# Patient Record
Sex: FEMALE | Race: WHITE | NOT HISPANIC OR LATINO | Employment: FULL TIME | URBAN - METROPOLITAN AREA
[De-identification: names, ages, dates, MRNs, and addresses within clinical notes are randomized per-mention and may not be internally consistent; named-entity substitution may affect disease eponyms.]

---

## 2021-03-19 ENCOUNTER — AMBULATORY - HEALTHEAST (OUTPATIENT)
Dept: NURSING | Facility: CLINIC | Age: 51
End: 2021-03-19

## 2021-04-12 ENCOUNTER — AMBULATORY - HEALTHEAST (OUTPATIENT)
Dept: NURSING | Facility: CLINIC | Age: 51
End: 2021-04-12

## 2021-09-02 ENCOUNTER — APPOINTMENT (OUTPATIENT)
Dept: URGENT CARE | Facility: CLINIC | Age: 51
End: 2021-09-02

## 2021-10-17 ENCOUNTER — HEALTH MAINTENANCE LETTER (OUTPATIENT)
Age: 51
End: 2021-10-17

## 2022-10-03 ENCOUNTER — HEALTH MAINTENANCE LETTER (OUTPATIENT)
Age: 52
End: 2022-10-03

## 2023-02-11 ENCOUNTER — HEALTH MAINTENANCE LETTER (OUTPATIENT)
Age: 53
End: 2023-02-11

## 2023-09-07 ENCOUNTER — OFFICE VISIT (OUTPATIENT)
Dept: FAMILY MEDICINE | Facility: CLINIC | Age: 53
End: 2023-09-07
Payer: COMMERCIAL

## 2023-09-07 VITALS
RESPIRATION RATE: 16 BRPM | OXYGEN SATURATION: 99 % | TEMPERATURE: 100.6 F | HEART RATE: 91 BPM | WEIGHT: 146 LBS | SYSTOLIC BLOOD PRESSURE: 138 MMHG | DIASTOLIC BLOOD PRESSURE: 84 MMHG

## 2023-09-07 DIAGNOSIS — R50.9 FEVER, UNSPECIFIED FEVER CAUSE: ICD-10-CM

## 2023-09-07 DIAGNOSIS — R30.0 DYSURIA: Primary | ICD-10-CM

## 2023-09-07 DIAGNOSIS — R81 GLUCOSURIA: ICD-10-CM

## 2023-09-07 PROBLEM — H52.4 PRESBYOPIA: Status: ACTIVE | Noted: 2019-08-29

## 2023-09-07 PROBLEM — H52.03 HYPEROPIA, BILATERAL: Status: ACTIVE | Noted: 2019-08-29

## 2023-09-07 PROBLEM — S83.512A LEFT ACL TEAR: Status: ACTIVE | Noted: 2019-12-09

## 2023-09-07 PROBLEM — H40.053 BORDERLINE GLAUCOMA OF BOTH EYES WITH OCULAR HYPERTENSION: Status: ACTIVE | Noted: 2019-08-29

## 2023-09-07 PROBLEM — H52.223 REGULAR ASTIGMATISM, BILATERAL: Status: ACTIVE | Noted: 2019-08-29

## 2023-09-07 LAB
ALBUMIN UR-MCNC: NEGATIVE MG/DL
APPEARANCE UR: CLEAR
BACTERIA #/AREA URNS HPF: ABNORMAL /HPF
BASOPHILS # BLD AUTO: 0 10E3/UL (ref 0–0.2)
BASOPHILS NFR BLD AUTO: 0 %
BILIRUB UR QL STRIP: NEGATIVE
CLUE CELLS: ABNORMAL
COLOR UR AUTO: YELLOW
EOSINOPHIL # BLD AUTO: 0.1 10E3/UL (ref 0–0.7)
EOSINOPHIL NFR BLD AUTO: 1 %
ERYTHROCYTE [DISTWIDTH] IN BLOOD BY AUTOMATED COUNT: 13.1 % (ref 10–15)
GLUCOSE UR STRIP-MCNC: 500 MG/DL
HBA1C MFR BLD: 5.4 % (ref 0–5.6)
HCT VFR BLD AUTO: 41.5 % (ref 35–47)
HGB BLD-MCNC: 14.1 G/DL (ref 11.7–15.7)
HGB UR QL STRIP: ABNORMAL
IMM GRANULOCYTES # BLD: 0 10E3/UL
IMM GRANULOCYTES NFR BLD: 0 %
KETONES UR STRIP-MCNC: 40 MG/DL
LEUKOCYTE ESTERASE UR QL STRIP: NEGATIVE
LYMPHOCYTES # BLD AUTO: 1.1 10E3/UL (ref 0.8–5.3)
LYMPHOCYTES NFR BLD AUTO: 13 %
MCH RBC QN AUTO: 31.5 PG (ref 26.5–33)
MCHC RBC AUTO-ENTMCNC: 34 G/DL (ref 31.5–36.5)
MCV RBC AUTO: 93 FL (ref 78–100)
MONOCYTES # BLD AUTO: 0.7 10E3/UL (ref 0–1.3)
MONOCYTES NFR BLD AUTO: 9 %
NEUTROPHILS # BLD AUTO: 6.2 10E3/UL (ref 1.6–8.3)
NEUTROPHILS NFR BLD AUTO: 77 %
NITRATE UR QL: NEGATIVE
PH UR STRIP: 5 [PH] (ref 5–8)
PLATELET # BLD AUTO: 334 10E3/UL (ref 150–450)
RBC # BLD AUTO: 4.47 10E6/UL (ref 3.8–5.2)
RBC #/AREA URNS AUTO: ABNORMAL /HPF
SARS-COV-2 RNA RESP QL NAA+PROBE: NEGATIVE
SP GR UR STRIP: 1.01 (ref 1–1.03)
SQUAMOUS #/AREA URNS AUTO: ABNORMAL /LPF
TRICHOMONAS, WET PREP: ABNORMAL
UROBILINOGEN UR STRIP-ACNC: 0.2 E.U./DL
WBC # BLD AUTO: 8.1 10E3/UL (ref 4–11)
WBC #/AREA URNS AUTO: ABNORMAL /HPF
WBC'S/HIGH POWER FIELD, WET PREP: ABNORMAL
YEAST, WET PREP: ABNORMAL

## 2023-09-07 PROCEDURE — 87210 SMEAR WET MOUNT SALINE/INK: CPT | Performed by: NURSE PRACTITIONER

## 2023-09-07 PROCEDURE — 87635 SARS-COV-2 COVID-19 AMP PRB: CPT | Performed by: NURSE PRACTITIONER

## 2023-09-07 PROCEDURE — 99204 OFFICE O/P NEW MOD 45 MIN: CPT | Performed by: NURSE PRACTITIONER

## 2023-09-07 PROCEDURE — 87186 SC STD MICRODIL/AGAR DIL: CPT | Performed by: NURSE PRACTITIONER

## 2023-09-07 PROCEDURE — 83036 HEMOGLOBIN GLYCOSYLATED A1C: CPT | Performed by: NURSE PRACTITIONER

## 2023-09-07 PROCEDURE — 87086 URINE CULTURE/COLONY COUNT: CPT | Performed by: NURSE PRACTITIONER

## 2023-09-07 PROCEDURE — 36415 COLL VENOUS BLD VENIPUNCTURE: CPT | Performed by: NURSE PRACTITIONER

## 2023-09-07 PROCEDURE — 81001 URINALYSIS AUTO W/SCOPE: CPT | Performed by: NURSE PRACTITIONER

## 2023-09-07 PROCEDURE — 85025 COMPLETE CBC W/AUTO DIFF WBC: CPT | Performed by: NURSE PRACTITIONER

## 2023-09-07 ASSESSMENT — ENCOUNTER SYMPTOMS
FEVER: 1
NAUSEA: 0
MYALGIAS: 1
VOMITING: 0

## 2023-09-08 ENCOUNTER — TELEPHONE (OUTPATIENT)
Dept: FAMILY MEDICINE | Facility: CLINIC | Age: 53
End: 2023-09-08
Payer: COMMERCIAL

## 2023-09-08 DIAGNOSIS — N39.0 URINARY TRACT INFECTION WITHOUT HEMATURIA, SITE UNSPECIFIED: Primary | ICD-10-CM

## 2023-09-08 RX ORDER — CIPROFLOXACIN 500 MG/1
500 TABLET, FILM COATED ORAL 2 TIMES DAILY
Qty: 14 TABLET | Refills: 0 | Status: SHIPPED | OUTPATIENT
Start: 2023-09-08 | End: 2023-09-15

## 2023-09-08 ASSESSMENT — ENCOUNTER SYMPTOMS: COUGH: 0

## 2023-09-08 NOTE — TELEPHONE ENCOUNTER
Called and unable to leave  due to mailbox not set up. Call again later.      Patrick Rosenbaum MA on 9/8/2023 at 2:41 PM

## 2023-09-08 NOTE — PROGRESS NOTES
Assessment & Plan     Dysuria    - UA Macroscopic with reflex to Microscopic and Culture - Clinic Collect  - UA Microscopic with Reflex to Culture  - Wet prep  - Urine Culture Aerobic Bacterial  - Urine Culture Aerobic Bacterial    Glucosuria    - Hemoglobin A1c  - Hemoglobin A1c    Fever, unspecified fever cause    - CBC with platelets and differential  - Symptomatic COVID-19 Virus (Coronavirus) by PCR Nose  - Urine Culture Aerobic Bacterial  - Urine Culture Aerobic Bacterial  - CBC with platelets and differential     Patient with low-grade fever who has not had a fever in her entire adult life that she can remember associated with back pain and dysuria with some right CVA tenderness and some overall back pain.  Had some right-sided back pain earlier in the week not associated with any illness symptoms.  However, urine is negative with the exception of glucose and microscopic hematuria with 2-5 red blood cells.    Strangely, her  is also here with dysuria that started the day before associated with nitrite positive urine, urinary pressure, body aches and fevers to 103.    Patient says she had a negative diabetes screening about 6 months ago.  Glucosuria could be from acute infection.  Can recheck this at a later time.    Consider COVID or other unrelated issue.      Wet prep - neg  A1c for glucosuria - WNL  CBC diff:  - WNL    Recommend watchful waiting for the next 1 to 2 days.  Come back for unexplained fevers in the next 1 to 2 days discussed.  We will add on urine culture and start antibiotics if it does start to grow bacteria.    Patient says she understands.    27 minutes spent by me on the date of the encounter doing chart review, review of test results, patient visit, documentation, and discussion with family         Return in about 2 days (around 9/9/2023) for If no better.    Beverly Arrieta, [facility]    Jayda Pruitt is a 53 year old female who presents to  "clinic today for the following health issues:  Chief Complaint   Patient presents with    Urinary Problem     X today. Burning. Frequency. Urgency.    Fever     X today.     HPI    Urinary symptoms starting today.  Assoc with back pain across back.      Symptoms Include:   Dysuria \"moderate\", frequency [x]    Has a current fever.      Denies external itching or irritation.    Of note,  also here with nitrite + UTI and fever to 103 that started the day before.    Denies other infection symptoms such as cough/congestion/sore throat.    A little bit of a back pain on the right side about a week ago that she had thought had gone away.  Back pain has returned associated with a fever and this dysuria.    History includes: No recent abx.  No fevers as an adult      Client  today.         Review of Systems   Constitutional:  Positive for fever.   HENT:  Negative for congestion.    Respiratory:  Negative for cough.    Gastrointestinal:  Negative for nausea and vomiting.   Musculoskeletal:  Positive for myalgias.           Objective    /84   Pulse 91   Temp (!) 100.6  F (38.1  C) (Oral)   Resp 16   Wt 66.2 kg (146 lb)   SpO2 99%   Physical Exam  Constitutional:       Appearance: Normal appearance.   Cardiovascular:      Pulses: Normal pulses.   Abdominal:      Tenderness: There is no abdominal tenderness. There is right CVA tenderness. There is no left CVA tenderness.   Musculoskeletal:         General: Normal range of motion.   Skin:     General: Skin is warm.   Neurological:      Mental Status: She is alert.   Psychiatric:         Mood and Affect: Mood normal.            Results for orders placed or performed in visit on 09/07/23 (from the past 24 hour(s))   UA Macroscopic with reflex to Microscopic and Culture - Clinic Collect    Specimen: Urine, Clean Catch   Result Value Ref Range    Color Urine Yellow Colorless, Straw, Light Yellow, Yellow    Appearance Urine Clear Clear    Glucose Urine 500 " (A) Negative mg/dL    Bilirubin Urine Negative Negative    Ketones Urine 40 (A) Negative mg/dL    Specific Gravity Urine 1.010 1.005 - 1.030    Blood Urine Small (A) Negative    pH Urine 5.0 5.0 - 8.0    Protein Albumin Urine Negative Negative mg/dL    Urobilinogen Urine 0.2 0.2, 1.0 E.U./dL    Nitrite Urine Negative Negative    Leukocyte Esterase Urine Negative Negative   UA Microscopic with Reflex to Culture   Result Value Ref Range    Bacteria Urine Few (A) None Seen /HPF    RBC Urine 2-5 (A) 0-2 /HPF /HPF    WBC Urine 0-5 0-5 /HPF /HPF    Squamous Epithelials Urine Moderate (A) None Seen /LPF    Narrative    Urine Culture not indicated   Wet prep    Specimen: Vagina; Swab   Result Value Ref Range    Trichomonas Absent Absent    Yeast Absent Absent    Clue Cells Absent Absent    WBCs/high power field 1+ (A) None   Hemoglobin A1c   Result Value Ref Range    Hemoglobin A1C 5.4 0.0 - 5.6 %   CBC with platelets and differential    Narrative    The following orders were created for panel order CBC with platelets and differential.  Procedure                               Abnormality         Status                     ---------                               -----------         ------                     CBC with platelets and d...[[id number]]                      Final result                 Please view results for these tests on the individual orders.   CBC with platelets and differential   Result Value Ref Range    WBC Count 8.1 4.0 - 11.0 10e3/uL    RBC Count 4.47 3.80 - 5.20 10e6/uL    Hemoglobin 14.1 11.7 - 15.7 g/dL    Hematocrit 41.5 35.0 - 47.0 %    MCV 93 78 - 100 fL    MCH 31.5 26.5 - 33.0 pg    MCHC 34.0 31.5 - 36.5 g/dL    RDW 13.1 10.0 - 15.0 %    Platelet Count 334 150 - 450 10e3/uL    % Neutrophils 77 %    % Lymphocytes 13 %    % Monocytes 9 %    % Eosinophils 1 %    % Basophils 0 %    % Immature Granulocytes 0 %    Absolute Neutrophils 6.2 1.6 - 8.3 10e3/uL    Absolute Lymphocytes 1.1 0.8 - 5.3 10e3/uL     Absolute Monocytes 0.7 0.0 - 1.3 10e3/uL    Absolute Eosinophils 0.1 0.0 - 0.7 10e3/uL    Absolute Basophils 0.0 0.0 - 0.2 10e3/uL    Absolute Immature Granulocytes 0.0 <=0.4 10e3/uL

## 2023-09-08 NOTE — TELEPHONE ENCOUNTER
Spoke with pt spouse and pt was occupied. Informed pt to call back when available. Phone number provided.      Patrick Rosenbaum MA on 9/8/2023 at 5:28 PM

## 2023-09-08 NOTE — PATIENT INSTRUCTIONS
I cannot explain your fever tonight.    Diabetes screening is normal.  Glucose in urine can be due to the infection.    COVID test will come to your MyChart tomorrow.    CBC screening test for infection was totally normal.     Do not go to work and rest until feeling better and no fever over 100.4 for 24 hours.  Tylenol or ibuprofen as needed for the back pain and burning.    Swab is normal-no yeast infection or other cause of burning with urination identified.    There is a little bit of microscopic blood in your urine.     If you start to improve, have your urine rechecked at a later time to make sure the blood has gone away.    We will call if your urine culture shows infection.     If your COVID test is negative and you still have a fever after 1-2 additional days, recommend coming back for more testing unless you develop an obvious syndrome such as cough and congestion.

## 2023-09-08 NOTE — TELEPHONE ENCOUNTER
----- Message from Ursula Padron PA-C sent at 9/8/2023  2:23 PM CDT -----  Team - please call to let pt know her urine grew bacteria consistent with UTI.    RX sent to her pharmacy [facility] for Cipro .      I did call once and no voice mail set up.    I did sent msg on my chart but has not used since 2021.  Please call to make sure she gets message.

## 2023-09-08 NOTE — TELEPHONE ENCOUNTER
Edmond is returning a missed call from the clinic.    Notified of Provider msg, noted below, and of Rx Abx sent to pharmacy.    Negin Choi RN  [facility]

## 2023-09-09 LAB — BACTERIA UR CULT: ABNORMAL

## 2024-03-09 ENCOUNTER — HEALTH MAINTENANCE LETTER (OUTPATIENT)
Age: 54
End: 2024-03-09

## 2025-01-19 ENCOUNTER — HEALTH MAINTENANCE LETTER (OUTPATIENT)
Age: 55
End: 2025-01-19

## 2025-03-16 ENCOUNTER — HEALTH MAINTENANCE LETTER (OUTPATIENT)
Age: 55
End: 2025-03-16